# Patient Record
Sex: MALE | Race: WHITE | NOT HISPANIC OR LATINO | Employment: OTHER | URBAN - METROPOLITAN AREA
[De-identification: names, ages, dates, MRNs, and addresses within clinical notes are randomized per-mention and may not be internally consistent; named-entity substitution may affect disease eponyms.]

---

## 2023-11-29 ENCOUNTER — APPOINTMENT (OUTPATIENT)
Dept: RADIOLOGY | Facility: HOSPITAL | Age: 63
End: 2023-11-29
Payer: MEDICARE

## 2023-11-29 ENCOUNTER — HOSPITAL ENCOUNTER (EMERGENCY)
Facility: HOSPITAL | Age: 63
Discharge: HOME | End: 2023-11-29
Payer: MEDICARE

## 2023-11-29 ENCOUNTER — HOSPITAL ENCOUNTER (EMERGENCY)
Dept: CARDIOLOGY | Facility: HOSPITAL | Age: 63
Discharge: HOME | End: 2023-11-29
Payer: MEDICARE

## 2023-11-29 VITALS
DIASTOLIC BLOOD PRESSURE: 75 MMHG | RESPIRATION RATE: 14 BRPM | HEIGHT: 70 IN | BODY MASS INDEX: 22.9 KG/M2 | SYSTOLIC BLOOD PRESSURE: 116 MMHG | TEMPERATURE: 97.7 F | OXYGEN SATURATION: 96 % | HEART RATE: 79 BPM | WEIGHT: 160 LBS

## 2023-11-29 DIAGNOSIS — U07.1 COVID: ICD-10-CM

## 2023-11-29 DIAGNOSIS — R53.1 GENERALIZED WEAKNESS: Primary | ICD-10-CM

## 2023-11-29 LAB
ALBUMIN SERPL BCP-MCNC: 4.1 G/DL (ref 3.4–5)
ALP SERPL-CCNC: 102 U/L (ref 33–136)
ALT SERPL W P-5'-P-CCNC: 72 U/L (ref 10–52)
ANION GAP SERPL CALC-SCNC: 21 MMOL/L (ref 10–20)
APPEARANCE UR: CLEAR
AST SERPL W P-5'-P-CCNC: 75 U/L (ref 9–39)
BASOPHILS # BLD AUTO: 0.01 X10*3/UL (ref 0–0.1)
BASOPHILS NFR BLD AUTO: 0.1 %
BILIRUB SERPL-MCNC: 0.3 MG/DL (ref 0–1.2)
BILIRUB UR STRIP.AUTO-MCNC: NEGATIVE MG/DL
BUN SERPL-MCNC: 29 MG/DL (ref 6–23)
CALCIUM SERPL-MCNC: 8.9 MG/DL (ref 8.6–10.3)
CARDIAC TROPONIN I PNL SERPL HS: 14 NG/L (ref 0–20)
CHLORIDE SERPL-SCNC: 91 MMOL/L (ref 98–107)
CO2 SERPL-SCNC: 24 MMOL/L (ref 21–32)
COLOR UR: YELLOW
CREAT SERPL-MCNC: 1.32 MG/DL (ref 0.5–1.3)
EOSINOPHIL # BLD AUTO: 0 X10*3/UL (ref 0–0.7)
EOSINOPHIL NFR BLD AUTO: 0 %
ERYTHROCYTE [DISTWIDTH] IN BLOOD BY AUTOMATED COUNT: 13 % (ref 11.5–14.5)
FLUAV RNA RESP QL NAA+PROBE: NOT DETECTED
FLUBV RNA RESP QL NAA+PROBE: NOT DETECTED
GFR SERPL CREATININE-BSD FRML MDRD: 60 ML/MIN/1.73M*2
GLUCOSE SERPL-MCNC: 150 MG/DL (ref 74–99)
GLUCOSE UR STRIP.AUTO-MCNC: ABNORMAL MG/DL
HCT VFR BLD AUTO: 46.7 % (ref 41–52)
HGB BLD-MCNC: 15.4 G/DL (ref 13.5–17.5)
HOLD SPECIMEN: NORMAL
HYALINE CASTS #/AREA URNS AUTO: ABNORMAL /LPF
IMM GRANULOCYTES # BLD AUTO: 0.03 X10*3/UL (ref 0–0.7)
IMM GRANULOCYTES NFR BLD AUTO: 0.3 % (ref 0–0.9)
INR PPP: 1.1 (ref 0.9–1.1)
KETONES UR STRIP.AUTO-MCNC: ABNORMAL MG/DL
LACTATE SERPL-SCNC: 1.9 MMOL/L (ref 0.4–2)
LEUKOCYTE ESTERASE UR QL STRIP.AUTO: NEGATIVE
LIPASE SERPL-CCNC: 81 U/L (ref 9–82)
LYMPHOCYTES # BLD AUTO: 1.01 X10*3/UL (ref 1.2–4.8)
LYMPHOCYTES NFR BLD AUTO: 9.1 %
MCH RBC QN AUTO: 29.7 PG (ref 26–34)
MCHC RBC AUTO-ENTMCNC: 33 G/DL (ref 32–36)
MCV RBC AUTO: 90 FL (ref 80–100)
MONOCYTES # BLD AUTO: 1.57 X10*3/UL (ref 0.1–1)
MONOCYTES NFR BLD AUTO: 14.2 %
MUCOUS THREADS #/AREA URNS AUTO: ABNORMAL /LPF
NEUTROPHILS # BLD AUTO: 8.42 X10*3/UL (ref 1.2–7.7)
NEUTROPHILS NFR BLD AUTO: 76.3 %
NITRITE UR QL STRIP.AUTO: NEGATIVE
NRBC BLD-RTO: 0 /100 WBCS (ref 0–0)
PH UR STRIP.AUTO: 5 [PH]
PLATELET # BLD AUTO: 199 X10*3/UL (ref 150–450)
POTASSIUM SERPL-SCNC: 4.8 MMOL/L (ref 3.5–5.3)
PROT SERPL-MCNC: 7.8 G/DL (ref 6.4–8.2)
PROT UR STRIP.AUTO-MCNC: ABNORMAL MG/DL
PROTHROMBIN TIME: 12.9 SECONDS (ref 9.8–12.8)
RBC # BLD AUTO: 5.18 X10*6/UL (ref 4.5–5.9)
RBC # UR STRIP.AUTO: ABNORMAL /UL
RBC #/AREA URNS AUTO: ABNORMAL /HPF
SARS-COV-2 RNA RESP QL NAA+PROBE: DETECTED
SODIUM SERPL-SCNC: 131 MMOL/L (ref 136–145)
SP GR UR STRIP.AUTO: 1.02
UROBILINOGEN UR STRIP.AUTO-MCNC: <2 MG/DL
WBC # BLD AUTO: 11 X10*3/UL (ref 4.4–11.3)
WBC #/AREA URNS AUTO: ABNORMAL /HPF

## 2023-11-29 PROCEDURE — 99285 EMERGENCY DEPT VISIT HI MDM: CPT | Mod: 25 | Performed by: PHYSICIAN ASSISTANT

## 2023-11-29 PROCEDURE — 83690 ASSAY OF LIPASE: CPT | Performed by: PHYSICIAN ASSISTANT

## 2023-11-29 PROCEDURE — 70450 CT HEAD/BRAIN W/O DYE: CPT | Performed by: RADIOLOGY

## 2023-11-29 PROCEDURE — 80053 COMPREHEN METABOLIC PANEL: CPT | Performed by: PHYSICIAN ASSISTANT

## 2023-11-29 PROCEDURE — 72100 X-RAY EXAM L-S SPINE 2/3 VWS: CPT | Performed by: RADIOLOGY

## 2023-11-29 PROCEDURE — 71045 X-RAY EXAM CHEST 1 VIEW: CPT | Performed by: RADIOLOGY

## 2023-11-29 PROCEDURE — 96360 HYDRATION IV INFUSION INIT: CPT

## 2023-11-29 PROCEDURE — 99284 EMERGENCY DEPT VISIT MOD MDM: CPT

## 2023-11-29 PROCEDURE — 85610 PROTHROMBIN TIME: CPT | Performed by: PHYSICIAN ASSISTANT

## 2023-11-29 PROCEDURE — 70450 CT HEAD/BRAIN W/O DYE: CPT

## 2023-11-29 PROCEDURE — 99285 EMERGENCY DEPT VISIT HI MDM: CPT | Mod: 25

## 2023-11-29 PROCEDURE — 72100 X-RAY EXAM L-S SPINE 2/3 VWS: CPT | Mod: FY

## 2023-11-29 PROCEDURE — 36415 COLL VENOUS BLD VENIPUNCTURE: CPT | Performed by: PHYSICIAN ASSISTANT

## 2023-11-29 PROCEDURE — 81003 URINALYSIS AUTO W/O SCOPE: CPT | Performed by: PHYSICIAN ASSISTANT

## 2023-11-29 PROCEDURE — 71045 X-RAY EXAM CHEST 1 VIEW: CPT | Mod: FY

## 2023-11-29 PROCEDURE — 2500000004 HC RX 250 GENERAL PHARMACY W/ HCPCS (ALT 636 FOR OP/ED): Performed by: PHYSICIAN ASSISTANT

## 2023-11-29 PROCEDURE — 84484 ASSAY OF TROPONIN QUANT: CPT | Performed by: PHYSICIAN ASSISTANT

## 2023-11-29 PROCEDURE — 81001 URINALYSIS AUTO W/SCOPE: CPT | Performed by: PHYSICIAN ASSISTANT

## 2023-11-29 PROCEDURE — 96360 HYDRATION IV INFUSION INIT: CPT | Performed by: PHYSICIAN ASSISTANT

## 2023-11-29 PROCEDURE — 93005 ELECTROCARDIOGRAM TRACING: CPT

## 2023-11-29 PROCEDURE — 85025 COMPLETE CBC W/AUTO DIFF WBC: CPT | Performed by: PHYSICIAN ASSISTANT

## 2023-11-29 PROCEDURE — 83605 ASSAY OF LACTIC ACID: CPT | Performed by: PHYSICIAN ASSISTANT

## 2023-11-29 PROCEDURE — 87636 SARSCOV2 & INF A&B AMP PRB: CPT | Performed by: PHYSICIAN ASSISTANT

## 2023-11-29 RX ADMIN — SODIUM CHLORIDE 500 ML: 9 INJECTION, SOLUTION INTRAVENOUS at 05:49

## 2023-11-29 ASSESSMENT — COLUMBIA-SUICIDE SEVERITY RATING SCALE - C-SSRS
1. IN THE PAST MONTH, HAVE YOU WISHED YOU WERE DEAD OR WISHED YOU COULD GO TO SLEEP AND NOT WAKE UP?: NO
6. HAVE YOU EVER DONE ANYTHING, STARTED TO DO ANYTHING, OR PREPARED TO DO ANYTHING TO END YOUR LIFE?: NO
2. HAVE YOU ACTUALLY HAD ANY THOUGHTS OF KILLING YOURSELF?: NO

## 2023-11-29 ASSESSMENT — PAIN - FUNCTIONAL ASSESSMENT
PAIN_FUNCTIONAL_ASSESSMENT: 0-10
PAIN_FUNCTIONAL_ASSESSMENT: 0-10

## 2023-11-29 ASSESSMENT — LIFESTYLE VARIABLES
HAVE YOU EVER FELT YOU SHOULD CUT DOWN ON YOUR DRINKING: NO
HAVE PEOPLE ANNOYED YOU BY CRITICIZING YOUR DRINKING: NO
EVER FELT BAD OR GUILTY ABOUT YOUR DRINKING: NO
REASON UNABLE TO ASSESS: NO
EVER HAD A DRINK FIRST THING IN THE MORNING TO STEADY YOUR NERVES TO GET RID OF A HANGOVER: NO

## 2023-11-29 ASSESSMENT — PAIN SCALES - GENERAL
PAINLEVEL_OUTOF10: 0 - NO PAIN
PAINLEVEL_OUTOF10: 0 - NO PAIN

## 2023-11-29 NOTE — ED PROVIDER NOTES
I received Jericho Emerson in signout from Azra HARRISON please see the previous note for all HPI, PE and MDM up to the time of signout at 0600.    In brief Jericho Emerson is an 63 y.o. male presenting for   Chief Complaint   Patient presents with    Weakness, Gen     Sleeping for days, no energy, hardly able to walk on his own and seems out of it per wife. This has been going on since Saturday.      63-year-old male with a history of HTN, HLD, CAD, GERD, diabetes presenting to the ED today with generalized weakness and fatigue for the past 3 days.  There was no fall or injury and no recent illness.  Patient states that he just feels weak all over his body and will sometimes feel nauseous and he has not been eating.  His wife is also the historian today.  He arrives afebrile with stable vital signs and is resting comfortably without signs of acute distress.  On my exam heart RRR, lungs are clear there is no CVA tenderness.  Abdomen soft nondistended nontender with normal bowel sounds.  He is tender midline through the lumbar spine without step-offs or obvious signs of trauma and there is no paraspinal tenderness.  CT head, chest x-ray and lumbar x-ray are ordered as well as ECG and labs.       ECG per my interpretation normal sinus rhythm at 78 bpm without acute ST-T wave changes or arrhythmia.  CT head shows no acute intracranial abnormality.  Chest x-ray shows no acute cardiopulmonary process and x-ray of the lumbar spine shows an age-indeterminate L1 fracture that does appear progressed from x-ray in 2010 but no osseous or retropulsion identified.  CBC with stable H&H and no leukocytosis.  Lipase is 81, lactic is 1.9.  Flu and COVID are negative.  CMP with mild elevation in AST and ALT.  Blood glucose is 150.  Creatinine is 1.32 and GFR is 60  Patient is still pending COVID result and urinalysis and ambulation.    Patient's urinalysis is negative for both leukocytes and nitrites.  Patient's COVID swab is  positive.  Patient requesting to have Paxlovid, prescription sent to pharmacy.  Patient was able to ambulate steadily and independently prior to discharge.  Patient courage to drink fluids and [facility]  We also discussed symptom management at home.  Return parameters discussed with patient.  All patient's questions and concerns were addressed prior to discharge.  We also discussed the most recent CDC recommendations for isolation.  Patient verbalizes understanding.  Patient discharged home in stable condition.    Pt Disposition: Home        SIMONE Beard-CNP  11/29/23 0715

## 2023-11-29 NOTE — ED PROVIDER NOTES
HPI   Chief Complaint   Patient presents with    Weakness, Gen     Sleeping for days, no energy, hardly able to walk on his own and seems out of it per wife. This has been going on since Saturday.        63-year-old male with history of HTN, HLD, CAD currently on Plavix, diabetes and GERD presenting to the ED today feeling weak, sleepy for the past 3 days.  There was no fall or injury,  patient tells me he has chronic issues with his lower back but he was carrying some wood Saturday, 3 days ago.  He felt really tired after moving the wood and his back felt achy so he laid down on the number since then he has felt very fatigued, does not want to eat or drink and he feels weak all over his body.  He denies headache or dizziness or visual changes and denies numbness or paresthesias.  He has not had a fever but spouse says he has had slight cough.  Patient denies any chest pain or shortness of breath and he denies nausea, vomiting, diarrhea or abdominal pain.  He denies any dark or bloody stools.  He tells me he does not have any leg pain or swelling and he denies history of DVT or PE.  No further complaints at this time.      History provided by:  Patient and spouse                      Tejal Coma Scale Score: 15                  Patient History   History reviewed. No pertinent past medical history.  History reviewed. No pertinent surgical history.  No family history on file.  Social History     Tobacco Use    Smoking status: Not on file    Smokeless tobacco: Not on file   Substance Use Topics    Alcohol use: Not on file    Drug use: Not on file       Physical Exam   ED Triage Vitals [11/29/23 0318]   Temp Heart Rate Resp BP   36.5 °C (97.7 °F) 89 18 117/64      SpO2 Temp Source Heart Rate Source Patient Position   97 % Temporal -- Lying      BP Location FiO2 (%)     Right arm --       Physical Exam  Constitutional:       General: He is not in acute distress.     Appearance: He is not toxic-appearing.   HENT:       Nose: Nose normal.      Mouth/Throat:      Mouth: Mucous membranes are moist.      Pharynx: Oropharynx is clear.   Eyes:      Extraocular Movements: Extraocular movements intact.      Conjunctiva/sclera: Conjunctivae normal.      Pupils: Pupils are equal, round, and reactive to light.   Cardiovascular:      Rate and Rhythm: Normal rate and regular rhythm.      Pulses: Normal pulses.      Heart sounds: Normal heart sounds.   Pulmonary:      Effort: Pulmonary effort is normal.      Breath sounds: Normal breath sounds.   Abdominal:      General: Bowel sounds are normal. There is no distension.      Palpations: Abdomen is soft.      Tenderness: There is no abdominal tenderness. There is no right CVA tenderness, left CVA tenderness or guarding.   Musculoskeletal:      Cervical back: Normal range of motion and neck supple. No rigidity or tenderness.      Comments: Tenderness over the lumbar spine midline diffusely without step-offs or obvious signs of trauma.  Equal strength tone and sensation to extremities without bony tenderness of bony deformity.  No calf tenderness or sign bilaterally. NVI.    Skin:     General: Skin is warm.      Capillary Refill: Capillary refill takes less than 2 seconds.   Neurological:      Mental Status: He is alert and oriented to person, place, and time.         ED Course & MDM   Diagnoses as of 11/29/23 0544   Generalized weakness       Medical Decision Making  63-year-old male with a history of HTN, HLD, CAD, GERD, diabetes presenting to the ED today with generalized weakness and fatigue for the past 3 days.  There was no fall or injury and no recent illness.  Patient states that he just feels weak all over his body and will sometimes feel nauseous and he has not been eating.  His wife is also the historian today.  He arrives afebrile with stable vital signs and is resting comfortably without signs of acute distress.  On my exam heart RRR, lungs are clear there is no CVA tenderness.   Abdomen soft nondistended nontender with normal bowel sounds.  He is tender midline through the lumbar spine without step-offs or obvious signs of trauma and there is no paraspinal tenderness.  CT head, chest x-ray and lumbar x-ray are ordered as well as ECG and labs.      ECG per my interpretation normal sinus rhythm at 78 bpm without acute ST-T wave changes or arrhythmia.  CT head shows no acute intracranial abnormality.  Chest x-ray shows no acute cardiopulmonary process and x-ray of the lumbar spine shows an age-indeterminate L1 fracture that does appear progressed from x-ray in 2010 but no osseous or retropulsion identified.  CBC with stable H&H and no leukocytosis.  Lipase is 81, lactic is 1.9.  Flu and COVID are negative.  CMP with mild elevation in AST and ALT.  Blood glucose is 150.  Creatinine is 1.32 and GFR is 60 however I am not able to compare to any old labs.  Patient is still pending COVID result and urinalysis and ambulation test, he is signed out of my care to Kelsy Street NP.            Procedure  Procedures     Azra Hyman PA-C  11/29/23 0544

## 2023-12-01 NOTE — RESULT ENCOUNTER NOTE
Entered erroneously in pool, if UA refluxes to culture that requires treatment, will be treated by pharmacy team.

## 2024-04-17 PROBLEM — I10 ESSENTIAL HYPERTENSION: Status: ACTIVE | Noted: 2024-04-17

## 2024-04-17 PROBLEM — I25.10 2-VESSEL CORONARY ARTERY DISEASE: Status: ACTIVE | Noted: 2024-04-17

## 2024-04-17 PROBLEM — E78.2 MIXED HYPERLIPIDEMIA: Status: ACTIVE | Noted: 2024-04-17

## 2024-04-17 PROBLEM — I25.5 CARDIOMYOPATHY, ISCHEMIC: Status: ACTIVE | Noted: 2024-04-17

## 2024-04-18 ENCOUNTER — OFFICE VISIT (OUTPATIENT)
Dept: CARDIOLOGY | Facility: CLINIC | Age: 64
End: 2024-04-18
Payer: OTHER GOVERNMENT

## 2024-04-18 VITALS
WEIGHT: 164.8 LBS | SYSTOLIC BLOOD PRESSURE: 98 MMHG | HEART RATE: 78 BPM | HEIGHT: 70 IN | BODY MASS INDEX: 23.59 KG/M2 | DIASTOLIC BLOOD PRESSURE: 62 MMHG

## 2024-04-18 DIAGNOSIS — E11.9 TYPE 2 DIABETES MELLITUS WITHOUT COMPLICATION, WITH LONG-TERM CURRENT USE OF INSULIN (MULTI): ICD-10-CM

## 2024-04-18 DIAGNOSIS — Z79.4 TYPE 2 DIABETES MELLITUS WITHOUT COMPLICATION, WITH LONG-TERM CURRENT USE OF INSULIN (MULTI): ICD-10-CM

## 2024-04-18 DIAGNOSIS — I25.10 2-VESSEL CORONARY ARTERY DISEASE: Primary | ICD-10-CM

## 2024-04-18 DIAGNOSIS — Z87.891 FORMER SMOKER: ICD-10-CM

## 2024-04-18 DIAGNOSIS — I25.5 CARDIOMYOPATHY, ISCHEMIC: ICD-10-CM

## 2024-04-18 DIAGNOSIS — I10 BENIGN ESSENTIAL HYPERTENSION: ICD-10-CM

## 2024-04-18 DIAGNOSIS — I10 ESSENTIAL HYPERTENSION: ICD-10-CM

## 2024-04-18 DIAGNOSIS — E78.2 MIXED HYPERLIPIDEMIA: ICD-10-CM

## 2024-04-18 DIAGNOSIS — R07.2 PRECORDIAL CHEST PAIN: ICD-10-CM

## 2024-04-18 PROBLEM — I42.9 CARDIOMYOPATHY (MULTI): Status: ACTIVE | Noted: 2024-04-18

## 2024-04-18 PROBLEM — I42.9 CARDIOMYOPATHY (MULTI): Status: RESOLVED | Noted: 2024-04-18 | Resolved: 2024-04-18

## 2024-04-18 PROCEDURE — 99204 OFFICE O/P NEW MOD 45 MIN: CPT | Performed by: INTERNAL MEDICINE

## 2024-04-18 PROCEDURE — 3074F SYST BP LT 130 MM HG: CPT | Performed by: INTERNAL MEDICINE

## 2024-04-18 PROCEDURE — 1159F MED LIST DOCD IN RCRD: CPT | Performed by: INTERNAL MEDICINE

## 2024-04-18 PROCEDURE — 3078F DIAST BP <80 MM HG: CPT | Performed by: INTERNAL MEDICINE

## 2024-04-18 PROCEDURE — 1160F RVW MEDS BY RX/DR IN RCRD: CPT | Performed by: INTERNAL MEDICINE

## 2024-04-18 PROCEDURE — 1036F TOBACCO NON-USER: CPT | Performed by: INTERNAL MEDICINE

## 2024-04-18 PROCEDURE — 93000 ELECTROCARDIOGRAM COMPLETE: CPT | Performed by: INTERNAL MEDICINE

## 2024-04-18 RX ORDER — IPRATROPIUM BROMIDE 42 UG/1
1 SPRAY, METERED NASAL 2 TIMES DAILY
COMMUNITY
Start: 2023-08-04

## 2024-04-18 RX ORDER — ARIPIPRAZOLE 15 MG/1
15 TABLET ORAL DAILY
COMMUNITY
Start: 2023-10-02

## 2024-04-18 RX ORDER — AMLODIPINE BESYLATE 5 MG/1
1 TABLET ORAL DAILY
COMMUNITY
Start: 2024-01-18

## 2024-04-18 RX ORDER — CLOPIDOGREL BISULFATE 75 MG/1
75 TABLET ORAL DAILY
COMMUNITY
Start: 2015-01-20

## 2024-04-18 RX ORDER — OMEPRAZOLE 20 MG/1
20 CAPSULE, DELAYED RELEASE ORAL
COMMUNITY
Start: 2024-02-02

## 2024-04-18 RX ORDER — DULOXETIN HYDROCHLORIDE 60 MG/1
1 CAPSULE, DELAYED RELEASE ORAL EVERY EVENING
COMMUNITY
Start: 2015-01-06

## 2024-04-18 RX ORDER — BUPROPION HYDROCHLORIDE 150 MG/1
150 TABLET, EXTENDED RELEASE ORAL 3 TIMES DAILY
COMMUNITY

## 2024-04-18 RX ORDER — ROSUVASTATIN CALCIUM 20 MG/1
1 TABLET, COATED ORAL NIGHTLY
COMMUNITY
Start: 2024-02-02

## 2024-04-18 RX ORDER — SEMAGLUTIDE 1.34 MG/ML
1 INJECTION, SOLUTION SUBCUTANEOUS
COMMUNITY

## 2024-04-18 RX ORDER — CHOLECALCIFEROL (VITAMIN D3) 25 MCG
75 TABLET ORAL DAILY
COMMUNITY
Start: 2023-09-13

## 2024-04-18 RX ORDER — NITROGLYCERIN 0.4 MG/1
0.4 TABLET SUBLINGUAL EVERY 5 MIN PRN
Qty: 100 TABLET | Refills: 3 | Status: SHIPPED | OUTPATIENT
Start: 2024-04-18 | End: 2025-04-18

## 2024-04-18 NOTE — PROGRESS NOTES
Cardiology Consultation- New Consult    Reason for referral: chest pressure    HPI: Jericho Emerson Jr. is a 63 y.o. male with known history of coronary artery disease back in 2014 he underwent PCI to the RCA and left circumflex.  He has not been seen by our office in several years.  The patient has a history of mild ischemic cardiomyopathy, hypertension and hyperlipidemia.  Patient report recently has been having episodes of chest heaviness without clear precipitating, alleviated or associated symptoms.  Typically nonexertional.  It lasted few minutes.  He remains reasonably active.  He is compliant with his medication.  He denies any intervening events since the last time we saw him in the office few years back.    Assessment    1.  Two-vessel coronary artery disease with remote PCI to the RCA and left circumflex with recent complaint of nonexertional brief chest heaviness  2.  Essential hypertension controlled  3.  [facility]  4.  Diabetes mellitus has lost 20 pounds on Ozempic  5.  Mild ischemic cardiomyopathy with LVEF around 45% in the past no recent reevaluation no    Recommendation    1.  I provide the patient was prescription for nitroglycerin to use as needed  2.  The patient appears to be on good medical regimen  3.  I recommend proceeding with GXT for evaluation  4.  Follow-up after testing is done      Past Medical History:   He has no past medical history on file.    Surgical History:   He has a past surgical history that includes Coronary stent placement; Gallbladder surgery; and Colonoscopy.    Family History:   Family History   Problem Relation Name Age of Onset    Heart failure Mother      Diabetes type I Mother      Hypertension Father      Stroke Father      Cancer Sister      No Known Problems Brother         Social History:   Social History     Tobacco Use    Smoking status: Former     Types: Cigarettes    Smokeless tobacco: Never   Substance Use Topics    Alcohol  "use: Never        Allergies:  Tetanus vaccines and toxoid     Current Medications:    Current Outpatient Medications:     amLODIPine (Norvasc) 5 mg tablet, Take 1 tablet (5 mg) by mouth once daily., Disp: , Rfl:     ARIPiprazole (Abilify) 15 mg tablet, Take 1 tablet (15 mg) by mouth once daily., Disp: , Rfl:     buPROPion SR (Wellbutrin SR) 150 mg 12 hr tablet, Take 1 tablet (150 mg) by mouth 3 times a day., Disp: , Rfl:     cholecalciferol (Vitamin D-3) 25 MCG (1000 UT) tablet, Take 3 tablets (75 mcg) by mouth once daily., Disp: , Rfl:     clopidogrel (Plavix) 75 mg tablet, Take 1 tablet (75 mg) by mouth once daily., Disp: , Rfl:     DULoxetine (Cymbalta) 60 mg DR capsule, Take 1 capsule (60 mg) by mouth once daily in the evening., Disp: , Rfl:     empagliflozin-metformin (Synjardy XR) 12.5-1,000 mg 24 hr tablet, Take 2 tablets by mouth once daily with breakfast., Disp: , Rfl:     ipratropium (Atrovent) 42 mcg (0.06 %) nasal spray, Administer 1 spray into each nostril 2 times a day., Disp: , Rfl:     omeprazole (PriLOSEC) 20 mg DR capsule, Take 1 capsule (20 mg) by mouth once daily in the morning. Take before meals., Disp: , Rfl:     rosuvastatin (Crestor) 20 mg tablet, Take 1 tablet (20 mg) by mouth once daily at bedtime., Disp: , Rfl:     semaglutide (Ozempic) 1 mg/dose (2 mg/1.5 mL) pen injector, Inject 1 mg under the skin 1 (one) time per week., Disp: , Rfl:     nitroglycerin (Nitrostat) 0.4 mg SL tablet, Place 1 tablet (0.4 mg) under the tongue every 5 minutes if needed for chest pain. May repeat dose every 5 minutes for up to 3 doses total., Disp: 100 tablet, Rfl: 3     Vitals:  Vitals:    04/18/24 1435 04/18/24 1436   BP: 102/68 98/62   BP Location: Left arm Right arm   Patient Position: Sitting    Pulse: 78    Weight: 74.8 kg (164 lb 12.8 oz)    Height: 1.778 m (5' 10\")     EKG done in office today        Review of Systems   All other systems reviewed and are negative.      Objective         Physical " Exam  Constitutional:       Appearance: Normal appearance.   HENT:      Nose: Nose normal.   Neck:      Vascular: No carotid bruit.   Cardiovascular:      Rate and Rhythm: Normal rate.      Pulses: Normal pulses.      Heart sounds: Normal heart sounds.   Pulmonary:      Effort: Pulmonary effort is normal.   Abdominal:      General: Bowel sounds are normal.      Palpations: Abdomen is soft.   Musculoskeletal:         General: Normal range of motion.      Cervical back: Normal range of motion.      Right lower leg: No edema.      Left lower leg: No edema.   Skin:     General: Skin is warm and dry.   Neurological:      General: No focal deficit present.      Mental Status: He is alert.   Psychiatric:         Mood and Affect: Mood normal.         Behavior: Behavior normal.         Thought Content: Thought content normal.         Judgment: Judgment normal.                Assessment and Plan:   1. 2-vessel coronary artery disease        2. Precordial chest pain  Follow Up In Cardiology    ECG 12 Lead    Stress Test    nitroglycerin (Nitrostat) 0.4 mg SL tablet      3. Cardiomyopathy, ischemic        4. Essential hypertension        5. Mixed hyperlipidemia        6. Benign essential hypertension        7. BMI 23.0-23.9, adult        8. Type 2 diabetes mellitus without complication, with long-term current use of insulin (Multi)        9. Former smoker               Scribe Attestation  By signing my name below, IThuy LPN  , Scribe   attest that this documentation has been prepared under the direction and in the presence of Ananda Marcial MD.    Provider Attestation - Scribe documentation    All medical record entries made by the Scribe were at my direction and personally dictated by me. I have reviewed the chart and agree that the record accurately reflects my personal performance of the history, physical exam, discussion and plan.

## 2024-04-18 NOTE — LETTER
April 18, 2024     Matt Burton DO  [address] [facility] [address]    Patient: Jericho Emerson Jr.   YOB: 1960   Date of Visit: 4/18/2024       Dear Dr. Matt Burton DO:    Thank you for referring Jericho Emerson to me for evaluation. Below are my notes for this consultation.  If you have questions, please do not hesitate to call me. I look forward to following your patient along with you.       Sincerely,     Ananda Marcial MD      CC: No Recipients  ______________________________________________________________________________________    Cardiology Consultation- New Consult    Reason for referral: chest pressure    HPI: Jericho Emerson Jr. is a 63 y.o. male with known history of coronary artery disease back in 2014 he underwent PCI to the RCA and left circumflex.  He has not been seen by our office in several years.  The patient has a history of mild ischemic cardiomyopathy, hypertension and hyperlipidemia.  Patient report recently has been having episodes of chest heaviness without clear precipitating, alleviated or associated symptoms.  Typically nonexertional.  It lasted few minutes.  He remains reasonably active.  He is compliant with his medication.  He denies any intervening events since the last time we saw him in the office few years back.    Assessment    1.  Two-vessel coronary artery disease with remote PCI to the RCA and left circumflex with recent complaint of nonexertional brief chest heaviness  2.  Essential hypertension controlled  3.  [facility]  4.  Diabetes mellitus has lost 20 pounds on Ozempic  5.  Mild ischemic cardiomyopathy with LVEF around 45% in the past no recent reevaluation no    Recommendation    1.  I provide the patient was prescription for nitroglycerin to use as needed  2.  The patient appears to be on good medical regimen  3.  I recommend proceeding with GXT for evaluation  4.  Follow-up  after testing is done      Past Medical History:   He has no past medical history on file.    Surgical History:   He has a past surgical history that includes Coronary stent placement; Gallbladder surgery; and Colonoscopy.    Family History:   Family History   Problem Relation Name Age of Onset   • Heart failure Mother     • Diabetes type I Mother     • Hypertension Father     • Stroke Father     • Cancer Sister     • No Known Problems Brother         Social History:   Social History     Tobacco Use   • Smoking status: Former     Types: Cigarettes   • Smokeless tobacco: Never   Substance Use Topics   • Alcohol use: Never        Allergies:  Tetanus vaccines and toxoid     Current Medications:    Current Outpatient Medications:   •  amLODIPine (Norvasc) 5 mg tablet, Take 1 tablet (5 mg) by mouth once daily., Disp: , Rfl:   •  ARIPiprazole (Abilify) 15 mg tablet, Take 1 tablet (15 mg) by mouth once daily., Disp: , Rfl:   •  buPROPion SR (Wellbutrin SR) 150 mg 12 hr tablet, Take 1 tablet (150 mg) by mouth 3 times a day., Disp: , Rfl:   •  cholecalciferol (Vitamin D-3) 25 MCG (1000 UT) tablet, Take 3 tablets (75 mcg) by mouth once daily., Disp: , Rfl:   •  clopidogrel (Plavix) 75 mg tablet, Take 1 tablet (75 mg) by mouth once daily., Disp: , Rfl:   •  DULoxetine (Cymbalta) 60 mg DR capsule, Take 1 capsule (60 mg) by mouth once daily in the evening., Disp: , Rfl:   •  empagliflozin-metformin (Synjardy XR) 12.5-1,000 mg 24 hr tablet, Take 2 tablets by mouth once daily with breakfast., Disp: , Rfl:   •  ipratropium (Atrovent) 42 mcg (0.06 %) nasal spray, Administer 1 spray into each nostril 2 times a day., Disp: , Rfl:   •  omeprazole (PriLOSEC) 20 mg DR capsule, Take 1 capsule (20 mg) by mouth once daily in the morning. Take before meals., Disp: , Rfl:   •  rosuvastatin (Crestor) 20 mg tablet, Take 1 tablet (20 mg) by mouth once daily at bedtime., Disp: , Rfl:   •  semaglutide (Ozempic) 1 mg/dose (2 mg/1.5 mL) pen  "injector, Inject 1 mg under the skin 1 (one) time per week., Disp: , Rfl:   •  nitroglycerin (Nitrostat) 0.4 mg SL tablet, Place 1 tablet (0.4 mg) under the tongue every 5 minutes if needed for chest pain. May repeat dose every 5 minutes for up to 3 doses total., Disp: 100 tablet, Rfl: 3     Vitals:  Vitals:    04/18/24 1435 04/18/24 1436   BP: 102/68 98/62   BP Location: Left arm Right arm   Patient Position: Sitting    Pulse: 78    Weight: 74.8 kg (164 lb 12.8 oz)    Height: 1.778 m (5' 10\")     EKG done in office today        Review of Systems   All other systems reviewed and are negative.      Objective        Physical Exam  Constitutional:       Appearance: Normal appearance.   HENT:      Nose: Nose normal.   Neck:      Vascular: No carotid bruit.   Cardiovascular:      Rate and Rhythm: Normal rate.      Pulses: Normal pulses.      Heart sounds: Normal heart sounds.   Pulmonary:      Effort: Pulmonary effort is normal.   Abdominal:      General: Bowel sounds are normal.      Palpations: Abdomen is soft.   Musculoskeletal:         General: Normal range of motion.      Cervical back: Normal range of motion.      Right lower leg: No edema.      Left lower leg: No edema.   Skin:     General: Skin is warm and dry.   Neurological:      General: No focal deficit present.      Mental Status: He is alert.   Psychiatric:         Mood and Affect: Mood normal.         Behavior: Behavior normal.         Thought Content: Thought content normal.         Judgment: Judgment normal.                Assessment and Plan:   1. 2-vessel coronary artery disease        2. Precordial chest pain  Follow Up In Cardiology    ECG 12 Lead    Stress Test    nitroglycerin (Nitrostat) 0.4 mg SL tablet      3. Cardiomyopathy, ischemic        4. Essential hypertension        5. Mixed hyperlipidemia        6. Benign essential hypertension        7. BMI 23.0-23.9, adult        8. Type 2 diabetes mellitus without complication, with long-term current " use of insulin (Multi)        9. Former smoker               Scribe Attestation  By signing my name below, I, Thuy SINGH LPN  , Scribe   attest that this documentation has been prepared under the direction and in the presence of Ananda Marcial MD.    Provider Attestation - Scribe documentation    All medical record entries made by the Scribe were at my direction and personally dictated by me. I have reviewed the chart and agree that the record accurately reflects my personal performance of the history, physical exam, discussion and plan.

## 2024-04-30 ENCOUNTER — APPOINTMENT (OUTPATIENT)
Dept: CARDIOLOGY | Facility: CLINIC | Age: 64
End: 2024-04-30
Payer: OTHER GOVERNMENT

## 2024-05-16 ENCOUNTER — APPOINTMENT (OUTPATIENT)
Dept: CARDIOLOGY | Facility: CLINIC | Age: 64
End: 2024-05-16
Payer: OTHER GOVERNMENT

## 2024-06-03 ENCOUNTER — HOSPITAL ENCOUNTER (OUTPATIENT)
Dept: CARDIOLOGY | Facility: CLINIC | Age: 64
Discharge: HOME | End: 2024-06-03
Payer: OTHER GOVERNMENT

## 2024-06-03 ENCOUNTER — APPOINTMENT (OUTPATIENT)
Dept: CARDIOLOGY | Facility: CLINIC | Age: 64
End: 2024-06-03
Payer: OTHER GOVERNMENT

## 2024-06-03 VITALS — SYSTOLIC BLOOD PRESSURE: 118 MMHG | HEART RATE: 77 BPM | DIASTOLIC BLOOD PRESSURE: 74 MMHG

## 2024-06-03 DIAGNOSIS — R07.2 PRECORDIAL CHEST PAIN: ICD-10-CM

## 2024-06-03 PROCEDURE — 93017 CV STRESS TEST TRACING ONLY: CPT

## 2024-07-18 ENCOUNTER — APPOINTMENT (OUTPATIENT)
Dept: CARDIOLOGY | Facility: CLINIC | Age: 64
End: 2024-07-18
Payer: OTHER GOVERNMENT

## 2024-07-18 VITALS
HEIGHT: 70 IN | WEIGHT: 165.2 LBS | DIASTOLIC BLOOD PRESSURE: 66 MMHG | SYSTOLIC BLOOD PRESSURE: 114 MMHG | HEART RATE: 84 BPM | BODY MASS INDEX: 23.65 KG/M2

## 2024-07-18 DIAGNOSIS — I25.10 2-VESSEL CORONARY ARTERY DISEASE: Primary | ICD-10-CM

## 2024-07-18 DIAGNOSIS — I10 ESSENTIAL HYPERTENSION: ICD-10-CM

## 2024-07-18 DIAGNOSIS — I25.5 CARDIOMYOPATHY, ISCHEMIC: ICD-10-CM

## 2024-07-18 DIAGNOSIS — R07.2 PRECORDIAL CHEST PAIN: ICD-10-CM

## 2024-07-18 DIAGNOSIS — I10 BENIGN ESSENTIAL HYPERTENSION: ICD-10-CM

## 2024-07-18 DIAGNOSIS — Z87.891 FORMER SMOKER: ICD-10-CM

## 2024-07-18 DIAGNOSIS — E78.2 MIXED HYPERLIPIDEMIA: ICD-10-CM

## 2024-07-18 PROCEDURE — 1159F MED LIST DOCD IN RCRD: CPT | Performed by: INTERNAL MEDICINE

## 2024-07-18 PROCEDURE — 3074F SYST BP LT 130 MM HG: CPT | Performed by: INTERNAL MEDICINE

## 2024-07-18 PROCEDURE — 99213 OFFICE O/P EST LOW 20 MIN: CPT | Performed by: INTERNAL MEDICINE

## 2024-07-18 PROCEDURE — 3078F DIAST BP <80 MM HG: CPT | Performed by: INTERNAL MEDICINE

## 2024-07-18 PROCEDURE — 1036F TOBACCO NON-USER: CPT | Performed by: INTERNAL MEDICINE

## 2024-07-18 PROCEDURE — 3008F BODY MASS INDEX DOCD: CPT | Performed by: INTERNAL MEDICINE

## 2024-07-18 PROCEDURE — 1160F RVW MEDS BY RX/DR IN RCRD: CPT | Performed by: INTERNAL MEDICINE

## 2024-07-18 NOTE — LETTER
"July 18, 2024     Matt Burton DO  [address] [facility] [address]    Patient: Jericho Emerson Jr.   YOB: 1960   Date of Visit: 7/18/2024       Dear Dr. Matt Burton DO:    Thank you for referring Jericho Emerson to me for evaluation. Below are my notes for this consultation.  If you have questions, please do not hesitate to call me. I look forward to following your patient along with you.       Sincerely,     Ananda Marcial MD      CC: No Recipients  ______________________________________________________________________________________    Subjective   Jericho Emerson Jr. is a 63 y.o. male       Chief Complaint    Follow-up          HPI   Patient is here for follow-up continue management for history of coronary artery disease with prior PCI to the RCA, hypertension and hyperlipidemia.  Since last time I saw him he reports he is feeling well.  He denies complaint of chest pain, palpitation, lightheadedness, dizziness or syncope.  His recent stress test was reviewed with him.  The test was submaximal but he did not have any anginal symptomatology.  Assessment     1.  Two-vessel coronary artery disease with remote PCI to the RCA and left circumflex he denies any recent chest pain.  Recent stress test noted and reviewed with him.  The test was submaximal  3.  [facility]  4.  Diabetes mellitus   5.  Mild ischemic cardiomyopathy with LVEF around 45%  6.  Parkinson disease     Recommendation     1.  I advised the patient to continue present medical regimen  2.  we discussed risk factor modification  3.  I adjusted follow-up in 6 months    Review of Systems   All other systems reviewed and are negative.           Vitals:    07/18/24 1512   BP: 114/66   BP Location: Left arm   Patient Position: Sitting   Pulse: 84   Weight: 74.9 kg (165 lb 3.2 oz)   Height: 1.778 m (5' 10\")        Objective   Physical Exam  Constitutional:       " Appearance: Normal appearance.   HENT:      Nose: Nose normal.   Neck:      Vascular: No carotid bruit.   Cardiovascular:      Rate and Rhythm: Normal rate.      Pulses: Normal pulses.      Heart sounds: Normal heart sounds.   Pulmonary:      Effort: Pulmonary effort is normal.   Abdominal:      General: Bowel sounds are normal.      Palpations: Abdomen is soft.   Musculoskeletal:         General: Normal range of motion.      Cervical back: Normal range of motion.      Right lower leg: No edema.      Left lower leg: No edema.   Skin:     General: Skin is warm and dry.   Neurological:      General: No focal deficit present.      Mental Status: He is alert.   Psychiatric:         Mood and Affect: Mood normal.         Behavior: Behavior normal.         Thought Content: Thought content normal.         Judgment: Judgment normal.         Allergies  Tetanus vaccines and toxoid     Current Medications    Current Outpatient Medications:   •  amLODIPine (Norvasc) 5 mg tablet, Take 1 tablet (5 mg) by mouth once daily., Disp: , Rfl:   •  ARIPiprazole (Abilify) 15 mg tablet, Take 1 tablet (15 mg) by mouth once daily., Disp: , Rfl:   •  buPROPion SR (Wellbutrin SR) 150 mg 12 hr tablet, Take 1 tablet (150 mg) by mouth 3 times a day., Disp: , Rfl:   •  cholecalciferol (Vitamin D-3) 25 MCG (1000 UT) tablet, Take 3 tablets (75 mcg) by mouth once daily., Disp: , Rfl:   •  clopidogrel (Plavix) 75 mg tablet, Take 1 tablet (75 mg) by mouth once daily., Disp: , Rfl:   •  DULoxetine (Cymbalta) 60 mg DR capsule, Take 1 capsule (60 mg) by mouth once daily in the evening., Disp: , Rfl:   •  empagliflozin-metformin (Synjardy XR) 12.5-1,000 mg 24 hr tablet, Take 2 tablets by mouth once daily with breakfast., Disp: , Rfl:   •  ipratropium (Atrovent) 42 mcg (0.06 %) nasal spray, Administer 1 spray into each nostril 2 times a day., Disp: , Rfl:   •  nitroglycerin (Nitrostat) 0.4 mg SL tablet, Place 1 tablet (0.4 mg) under the tongue every 5  minutes if needed for chest pain. May repeat dose every 5 minutes for up to 3 doses total., Disp: 100 tablet, Rfl: 3  •  omeprazole (PriLOSEC) 20 mg DR capsule, Take 1 capsule (20 mg) by mouth once daily in the morning. Take before meals., Disp: , Rfl:   •  rosuvastatin (Crestor) 20 mg tablet, Take 1 tablet (20 mg) by mouth once daily at bedtime., Disp: , Rfl:   •  semaglutide (Ozempic) 1 mg/dose (2 mg/1.5 mL) pen injector, Inject 1 mg under the skin 1 (one) time per week., Disp: , Rfl:                      Assessment/Plan   1. 2-vessel coronary artery disease  Follow Up In Cardiology      2. Precordial chest pain  Follow Up In Cardiology      3. Benign essential hypertension        4. Cardiomyopathy, ischemic        5. Essential hypertension        6. Mixed hyperlipidemia        7. BMI 23.0-23.9, adult        8. Former smoker                 Scribe Attestation  By signing my name below, I, Amy Clements LPN   attest that this documentation has been prepared under the direction and in the presence of Ananda Marcial MD.     Provider Attestation - Scribe documentation    All medical record entries made by the Scribe were at my direction and personally dictated by me. I have reviewed the chart and agree that the record accurately reflects my personal performance of the history, physical exam, discussion and plan.

## 2024-07-18 NOTE — PROGRESS NOTES
"Subjective   Jericho Emerson Jr. is a 63 y.o. male       Chief Complaint    Follow-up          HPI   Patient is here for follow-up continue management for history of coronary artery disease with prior PCI to the RCA, hypertension and hyperlipidemia.  Since last time I saw him he reports he is feeling well.  He denies complaint of chest pain, palpitation, lightheadedness, dizziness or syncope.  His recent stress test was reviewed with him.  The test was submaximal but he did not have any anginal symptomatology.  Assessment     1.  Two-vessel coronary artery disease with remote PCI to the RCA and left circumflex he denies any recent chest pain.  Recent stress test noted and reviewed with him.  The test was submaximal  3.  [facility]  4.  Diabetes mellitus   5.  Mild ischemic cardiomyopathy with LVEF around 45%  6.  Parkinson disease     Recommendation     1.  I advised the patient to continue present medical regimen  2.  we discussed risk factor modification  3.  I adjusted follow-up in 6 months    Review of Systems   All other systems reviewed and are negative.           Vitals:    07/18/24 1512   BP: 114/66   BP Location: Left arm   Patient Position: Sitting   Pulse: 84   Weight: 74.9 kg (165 lb 3.2 oz)   Height: 1.778 m (5' 10\")        Objective   Physical Exam  Constitutional:       Appearance: Normal appearance.   HENT:      Nose: Nose normal.   Neck:      Vascular: No carotid bruit.   Cardiovascular:      Rate and Rhythm: Normal rate.      Pulses: Normal pulses.      Heart sounds: Normal heart sounds.   Pulmonary:      Effort: Pulmonary effort is normal.   Abdominal:      General: Bowel sounds are normal.      Palpations: Abdomen is soft.   Musculoskeletal:         General: Normal range of motion.      Cervical back: Normal range of motion.      Right lower leg: No edema.      Left lower leg: No edema.   Skin:     General: Skin is warm and dry.   Neurological:      General: No focal " deficit present.      Mental Status: He is alert.   Psychiatric:         Mood and Affect: Mood normal.         Behavior: Behavior normal.         Thought Content: Thought content normal.         Judgment: Judgment normal.         Allergies  Tetanus vaccines and toxoid     Current Medications    Current Outpatient Medications:     amLODIPine (Norvasc) 5 mg tablet, Take 1 tablet (5 mg) by mouth once daily., Disp: , Rfl:     ARIPiprazole (Abilify) 15 mg tablet, Take 1 tablet (15 mg) by mouth once daily., Disp: , Rfl:     buPROPion SR (Wellbutrin SR) 150 mg 12 hr tablet, Take 1 tablet (150 mg) by mouth 3 times a day., Disp: , Rfl:     cholecalciferol (Vitamin D-3) 25 MCG (1000 UT) tablet, Take 3 tablets (75 mcg) by mouth once daily., Disp: , Rfl:     clopidogrel (Plavix) 75 mg tablet, Take 1 tablet (75 mg) by mouth once daily., Disp: , Rfl:     DULoxetine (Cymbalta) 60 mg DR capsule, Take 1 capsule (60 mg) by mouth once daily in the evening., Disp: , Rfl:     empagliflozin-metformin (Synjardy XR) 12.5-1,000 mg 24 hr tablet, Take 2 tablets by mouth once daily with breakfast., Disp: , Rfl:     ipratropium (Atrovent) 42 mcg (0.06 %) nasal spray, Administer 1 spray into each nostril 2 times a day., Disp: , Rfl:     nitroglycerin (Nitrostat) 0.4 mg SL tablet, Place 1 tablet (0.4 mg) under the tongue every 5 minutes if needed for chest pain. May repeat dose every 5 minutes for up to 3 doses total., Disp: 100 tablet, Rfl: 3    omeprazole (PriLOSEC) 20 mg DR capsule, Take 1 capsule (20 mg) by mouth once daily in the morning. Take before meals., Disp: , Rfl:     rosuvastatin (Crestor) 20 mg tablet, Take 1 tablet (20 mg) by mouth once daily at bedtime., Disp: , Rfl:     semaglutide (Ozempic) 1 mg/dose (2 mg/1.5 mL) pen injector, Inject 1 mg under the skin 1 (one) time per week., Disp: , Rfl:                      Assessment/Plan   1. 2-vessel coronary artery disease  Follow Up In Cardiology      2. Precordial chest pain  Follow Up  In Cardiology      3. Benign essential hypertension        4. Cardiomyopathy, ischemic        5. Essential hypertension        6. Mixed hyperlipidemia        7. BMI 23.0-23.9, adult        8. Former smoker                 Scribe Attestation  By signing my name below, I, Amy Clements LPN   attest that this documentation has been prepared under the direction and in the presence of Ananda Marcial MD.     Provider Attestation - Scribe documentation    All medical record entries made by the Scribe were at my direction and personally dictated by me. I have reviewed the chart and agree that the record accurately reflects my personal performance of the history, physical exam, discussion and plan.

## 2025-02-24 ENCOUNTER — APPOINTMENT (OUTPATIENT)
Dept: CARDIOLOGY | Facility: CLINIC | Age: 65
End: 2025-02-24
Payer: OTHER GOVERNMENT

## 2025-03-24 ENCOUNTER — APPOINTMENT (OUTPATIENT)
Dept: CARDIOLOGY | Facility: CLINIC | Age: 65
End: 2025-03-24
Payer: OTHER GOVERNMENT

## 2025-03-24 VITALS
BODY MASS INDEX: 24.77 KG/M2 | DIASTOLIC BLOOD PRESSURE: 60 MMHG | HEART RATE: 76 BPM | HEIGHT: 70 IN | WEIGHT: 173 LBS | SYSTOLIC BLOOD PRESSURE: 112 MMHG

## 2025-03-24 DIAGNOSIS — I10 ESSENTIAL HYPERTENSION: ICD-10-CM

## 2025-03-24 DIAGNOSIS — I25.5 CARDIOMYOPATHY, ISCHEMIC: Primary | ICD-10-CM

## 2025-03-24 DIAGNOSIS — I25.10 2-VESSEL CORONARY ARTERY DISEASE: ICD-10-CM

## 2025-03-24 DIAGNOSIS — E78.2 MIXED HYPERLIPIDEMIA: ICD-10-CM

## 2025-03-24 PROBLEM — R07.2 PRECORDIAL CHEST PAIN: Status: RESOLVED | Noted: 2024-04-18 | Resolved: 2025-03-24

## 2025-03-24 PROCEDURE — 3078F DIAST BP <80 MM HG: CPT | Performed by: NURSE PRACTITIONER

## 2025-03-24 PROCEDURE — 1036F TOBACCO NON-USER: CPT | Performed by: NURSE PRACTITIONER

## 2025-03-24 PROCEDURE — 3008F BODY MASS INDEX DOCD: CPT | Performed by: NURSE PRACTITIONER

## 2025-03-24 PROCEDURE — 99214 OFFICE O/P EST MOD 30 MIN: CPT | Performed by: NURSE PRACTITIONER

## 2025-03-24 PROCEDURE — 3074F SYST BP LT 130 MM HG: CPT | Performed by: NURSE PRACTITIONER

## 2025-03-24 PROCEDURE — 1160F RVW MEDS BY RX/DR IN RCRD: CPT | Performed by: NURSE PRACTITIONER

## 2025-03-24 PROCEDURE — 1159F MED LIST DOCD IN RCRD: CPT | Performed by: NURSE PRACTITIONER

## 2025-03-24 ASSESSMENT — ENCOUNTER SYMPTOMS
PND: 0
IRREGULAR HEARTBEAT: 0
SYNCOPE: 0
DYSPNEA ON EXERTION: 0
PALPITATIONS: 0
ORTHOPNEA: 0
NEAR-SYNCOPE: 0

## 2025-03-24 NOTE — LETTER
"March 24, 2025     Matt Burton DO  [address] [facility] [address]    Patient: Jericho Emerson Jr.   YOB: 1960   Date of Visit: 3/24/2025       Dear Dr. Matt Burton DO:    Thank you for referring Jericho Emerson to me for evaluation. Below are my notes for this consultation.  If you have questions, please do not hesitate to call me. I look forward to following your patient along with you.       Sincerely,     Lisa Palmer, APRN-CNP      CC: No Recipients  ______________________________________________________________________________________    Chief Complaint  \"Doing fairly well\"    Reason for Visit  9-month follow-up  Patient presents to the office today for outpatient follow-up for hypertension, hyperlipidemia, and coronary artery disease.  Cardiomyopathy.  Last evaluated in clinic by Dr. Marcial July 2024.    Presents today ambulatory with steady gait.  Accompanied by patient  Patient denies any hospitalizations or significant changes to interval medical history since last office follow-up.   He follows routinely with PCP with annual wellness labs.    History of Present Illness   Patient is a very pleasant 64-year-old gentleman who presents to the office today with no voiced cardiovascular complaints.  He continues to work part-time as a  at [employer] where he is on his feet all day and lifting items.  His prior angina symptom was\" my left arm going numb\" and he denies any recurrence.  Ambulated in from the parking lot without dyspnea on exertion.  No evidence orthopnea or PND.  No palpitations.    Patient reports that overall has no complaint(s) of chest pain, chest pressure/discomfort, claudication, dyspnea, exertional chest pressure/discomfort, fatigue, irregular heart beat, and lower extremity edema    Daily activity:    Greater than 4 METS  Denies any change in exercise capacity or functional tolerance since last office visit.    The importance of " "secondary prevention reviewed:  HTN: Optimal  HLD: Treated, due for labs  DM: Recent hemoglobin A1c 7.0  Smoker: Denies  BMI:  Reviewed the merits of healthy lifestyle choices on overall cardiovascular health.    Discussed the dynamic nature of coronary artery disease and the importance of seeking medical attention if new symptoms arise.    Review of Systems   Cardiovascular:  Negative for chest pain, dyspnea on exertion, irregular heartbeat, leg swelling, near-syncope, orthopnea, palpitations, paroxysmal nocturnal dyspnea and syncope.        Visit Vitals  /60 (BP Location: Left arm, Patient Position: Sitting)   Pulse 76   Ht 1.778 m (5' 10\")   Wt 78.5 kg (173 lb)   BMI 24.82 kg/m²   Smoking Status Former   BSA 1.97 m²     Physical Exam  Vitals and nursing note reviewed.   Constitutional:       Appearance: Normal appearance.   Cardiovascular:      Rate and Rhythm: Normal rate and regular rhythm.      Heart sounds: Normal heart sounds.   Pulmonary:      Effort: Pulmonary effort is normal.      Breath sounds: Normal breath sounds.   Musculoskeletal:      Cervical back: Full passive range of motion without pain.      Right lower leg: No edema.      Left lower leg: No edema.   Skin:     General: Skin is cool.   Neurological:      Mental Status: He is alert and oriented to person, place, and time.   Psychiatric:         Attention and Perception: Attention normal.         Mood and Affect: Mood normal.         Behavior: Behavior is cooperative.        Allergies   Allergen Reactions   • Tetanus Vaccines And Toxoid Unknown     Other Reaction(s): unknown--childhood       Current Outpatient Medications   Medication Instructions   • amLODIPine (Norvasc) 5 mg tablet 1 tablet, Daily   • ARIPiprazole (ABILIFY) 15 mg, Daily   • buPROPion SR (WELLBUTRIN SR) 100 mg, oral, Daily   • cholecalciferol (VITAMIN D-3) 75 mcg, Daily   • clopidogrel (PLAVIX) 75 mg, Daily   • DULoxetine (Cymbalta) 60 mg DR capsule 1 capsule, Every " evening   • empagliflozin-metformin (Synjardy XR) 12.5-1,000 mg 24 hr tablet 2 tablets, Daily with breakfast   • ipratropium (Atrovent) 42 mcg (0.06 %) nasal spray 1 spray, 2 times daily   • nitroglycerin (NITROSTAT) 0.4 mg, sublingual, Every 5 min PRN, May repeat dose every 5 minutes for up to 3 doses total.   • omeprazole (PRILOSEC) 20 mg, Daily before breakfast   • Ozempic 1 mg, Once Weekly   • rosuvastatin (Crestor) 20 mg tablet 1 tablet, Nightly      Assessment:    2-vessel coronary artery disease  Jan 2015  mCX Cutting Balloon/PTCA/BUZZ 3 x 20 mm due to ISR  Distal RCA 99% ISR -PTCA less than 10% residual  Mid LAD 30%  LVEF 55%    April 2018 MPI  No ischemia, no infarct  LVEF 39%    June 2024 GXT  Nondiagnostic at 6.3 METS    Current daily activity greater than 4 METS without concerning symptoms    Cardiomyopathy, ischemic  January 2015 cardiac cath LVEF 55%  April 2018 MPI LVEF 39%  April 2018 TTE LVEF 40%    Remains functional class II    Currently no beta-blocker, ACE/ARB, SGLT2i    Repeat echocardiogram to reassess and return to clinic to optimize treatment if warranted.    Essential hypertension  Optimal in office    Mixed hyperlipidemia  High intensity statin  Annual wellness labs through PCP    Plan:     Through informed decision making process incorporating patients unique circumstances, the following treatment plan will be initiated:    1.  Prescription drug management of cardiovascular medication for efficacy, adherence to treatment, side effect assessment and polypharmacy. Current treatment clinically warranted and to continue without modifications.    2.  Echo (ICM)    3. Return for follow-up; in the interim, contact the office if new symptoms arise.  Dr. Marcial 9 months unless abnormal testing    Lisa Palmer MSN, APRN-CNP, PMHNP-[facility] [place]  [place], [place]     Please excuse any errors in grammar or translation related to this dictation.  Voice recognition software was utilized to prepare this document.

## 2025-03-24 NOTE — PATIENT INSTRUCTIONS
Please bring all medicines, vitamins, and herbal supplements with you when you come to the office.    Prescriptions will not be filled unless you are compliant with your follow up appointments or have a follow up appointment scheduled as per instruction of your physician. Refills should be requested at the time of your visit.     PLAN:   Through informed decision making process incorporating patients unique circumstances, the following treatment plan will be initiated:    1.  Prescription drug management of cardiovascular medication for efficacy, adherence to treatment, side effect assessment and polypharmacy. Current treatment clinically warranted and to continue without modifications.    2.  Echo (ICM)    3. Return for follow-up; in the interim, contact the office if new symptoms arise.  Dr. Marcial 9 months unless abnormal testing

## 2025-03-24 NOTE — ASSESSMENT & PLAN NOTE
Jan 2015  mCX Cutting Balloon/PTCA/BUZZ 3 x 20 mm due to ISR  Distal RCA 99% ISR -PTCA less than 10% residual  Mid LAD 30%  LVEF 55%    April 2018 MPI  No ischemia, no infarct  LVEF 39%    June 2024 GXT  Nondiagnostic at 6.3 METS    Current daily activity greater than 4 METS without concerning symptoms

## 2025-03-24 NOTE — ASSESSMENT & PLAN NOTE
January 2015 cardiac cath LVEF 55%  April 2018 MPI LVEF 39%  April 2018 TTE LVEF 40%    Remains functional class II    Currently no beta-blocker, ACE/ARB, SGLT2i    Repeat echocardiogram to reassess and return to clinic to optimize treatment if warranted.

## 2025-03-24 NOTE — PROGRESS NOTES
"Chief Complaint  \"Doing fairly well\"    Reason for Visit  9-month follow-up  Patient presents to the office today for outpatient follow-up for hypertension, hyperlipidemia, and coronary artery disease.  Cardiomyopathy.  Last evaluated in clinic by Dr. Marcial July 2024.    Presents today ambulatory with steady gait.  Accompanied by patient  Patient denies any hospitalizations or significant changes to interval medical history since last office follow-up.   He follows routinely with PCP with annual wellness labs.    History of Present Illness   Patient is a very pleasant 64-year-old gentleman who presents to the office today with no voiced cardiovascular complaints.  He continues to work part-time as a  at [employer] where he is on his feet all day and lifting items.  His prior angina symptom was\" my left arm going numb\" and he denies any recurrence.  Ambulated in from the parking lot without dyspnea on exertion.  No evidence orthopnea or PND.  No palpitations.    Patient reports that overall has no complaint(s) of chest pain, chest pressure/discomfort, claudication, dyspnea, exertional chest pressure/discomfort, fatigue, irregular heart beat, and lower extremity edema    Daily activity:    Greater than 4 METS  Denies any change in exercise capacity or functional tolerance since last office visit.    The importance of secondary prevention reviewed:  HTN: Optimal  HLD: Treated, due for labs  DM: Recent hemoglobin A1c 7.0  Smoker: Denies  BMI:  Reviewed the merits of healthy lifestyle choices on overall cardiovascular health.    Discussed the dynamic nature of coronary artery disease and the importance of seeking medical attention if new symptoms arise.    Review of Systems   Cardiovascular:  Negative for chest pain, dyspnea on exertion, irregular heartbeat, leg swelling, near-syncope, orthopnea, palpitations, paroxysmal nocturnal dyspnea and syncope.        Visit Vitals  /60 (BP Location: Left arm, " "Patient Position: Sitting)   Pulse 76   Ht 1.778 m (5' 10\")   Wt 78.5 kg (173 lb)   BMI 24.82 kg/m²   Smoking Status Former   BSA 1.97 m²     Physical Exam  Vitals and nursing note reviewed.   Constitutional:       Appearance: Normal appearance.   Cardiovascular:      Rate and Rhythm: Normal rate and regular rhythm.      Heart sounds: Normal heart sounds.   Pulmonary:      Effort: Pulmonary effort is normal.      Breath sounds: Normal breath sounds.   Musculoskeletal:      Cervical back: Full passive range of motion without pain.      Right lower leg: No edema.      Left lower leg: No edema.   Skin:     General: Skin is cool.   Neurological:      Mental Status: He is alert and oriented to person, place, and time.   Psychiatric:         Attention and Perception: Attention normal.         Mood and Affect: Mood normal.         Behavior: Behavior is cooperative.        Allergies   Allergen Reactions    Tetanus Vaccines And Toxoid Unknown     Other Reaction(s): unknown--childhood       Current Outpatient Medications   Medication Instructions    amLODIPine (Norvasc) 5 mg tablet 1 tablet, Daily    ARIPiprazole (ABILIFY) 15 mg, Daily    buPROPion SR (WELLBUTRIN SR) 100 mg, oral, Daily    cholecalciferol (VITAMIN D-3) 75 mcg, Daily    clopidogrel (PLAVIX) 75 mg, Daily    DULoxetine (Cymbalta) 60 mg DR capsule 1 capsule, Every evening    empagliflozin-metformin (Synjardy XR) 12.5-1,000 mg 24 hr tablet 2 tablets, Daily with breakfast    ipratropium (Atrovent) 42 mcg (0.06 %) nasal spray 1 spray, 2 times daily    nitroglycerin (NITROSTAT) 0.4 mg, sublingual, Every 5 min PRN, May repeat dose every 5 minutes for up to 3 doses total.    omeprazole (PRILOSEC) 20 mg, Daily before breakfast    Ozempic 1 mg, Once Weekly    rosuvastatin (Crestor) 20 mg tablet 1 tablet, Nightly      Assessment:    2-vessel coronary artery disease  Jan 2015  mCX Cutting Balloon/PTCA/BUZZ 3 x 20 mm due to ISR  Distal RCA 99% ISR -PTCA less than 10% " residual  Mid LAD 30%  LVEF 55%    April 2018 MPI  No ischemia, no infarct  LVEF 39%    June 2024 GXT  Nondiagnostic at 6.3 METS    Current daily activity greater than 4 METS without concerning symptoms    Cardiomyopathy, ischemic  January 2015 cardiac cath LVEF 55%  April 2018 MPI LVEF 39%  April 2018 TTE LVEF 40%    Remains functional class II    Currently no beta-blocker, ACE/ARB, SGLT2i    Repeat echocardiogram to reassess and return to clinic to optimize treatment if warranted.    Essential hypertension  Optimal in office    Mixed hyperlipidemia  High intensity statin  Annual wellness labs through PCP    Plan:     Through informed decision making process incorporating patients unique circumstances, the following treatment plan will be initiated:    1.  Prescription drug management of cardiovascular medication for efficacy, adherence to treatment, side effect assessment and polypharmacy. Current treatment clinically warranted and to continue without modifications.    2.  Echo (ICM)    3. Return for follow-up; in the interim, contact the office if new symptoms arise.  Dr. Marcial 9 months unless abnormal testing    Lisa Palmer MSN, APRN-CNP, PMHNP-[facility] [place]  [place], [place]     Please excuse any errors in grammar or translation related to this dictation. Voice recognition software was utilized to prepare this document.

## 2025-05-27 ENCOUNTER — HOSPITAL ENCOUNTER (OUTPATIENT)
Dept: CARDIOLOGY | Facility: CLINIC | Age: 65
Discharge: HOME | End: 2025-05-27
Payer: OTHER GOVERNMENT

## 2025-05-27 VITALS
DIASTOLIC BLOOD PRESSURE: 70 MMHG | HEIGHT: 70 IN | BODY MASS INDEX: 24.77 KG/M2 | WEIGHT: 173 LBS | SYSTOLIC BLOOD PRESSURE: 114 MMHG

## 2025-05-27 DIAGNOSIS — I25.5 CARDIOMYOPATHY, ISCHEMIC: ICD-10-CM

## 2025-05-27 DIAGNOSIS — I25.10 2-VESSEL CORONARY ARTERY DISEASE: ICD-10-CM

## 2025-05-27 PROCEDURE — 93306 TTE W/DOPPLER COMPLETE: CPT

## 2025-05-28 LAB
AORTIC VALVE MEAN GRADIENT: 4 MMHG
AORTIC VALVE PEAK VELOCITY: 1.35 M/S
AV PEAK GRADIENT: 7 MMHG
AVA (PEAK VEL): 3.3 CM2
AVA (VTI): 2.89 CM2
EJECTION FRACTION APICAL 4 CHAMBER: 46.2
EJECTION FRACTION: 48 %
LEFT ATRIUM VOLUME AREA LENGTH INDEX BSA: 23.6 ML/M2
LEFT VENTRICLE INTERNAL DIMENSION DIASTOLE: 4.69 CM (ref 3.5–6)
LEFT VENTRICULAR OUTFLOW TRACT DIAMETER: 2.45 CM
LV EJECTION FRACTION BIPLANE: 44 %
MITRAL VALVE E/A RATIO: 0.68
MITRAL VALVE E/E' RATIO: 6.92
RIGHT VENTRICLE FREE WALL PEAK S': 17.71 CM/S
TRICUSPID ANNULAR PLANE SYSTOLIC EXCURSION: 2.7 CM

## 2025-05-29 ENCOUNTER — TELEPHONE (OUTPATIENT)
Dept: CARDIOLOGY | Facility: CLINIC | Age: 65
End: 2025-05-29
Payer: OTHER GOVERNMENT

## 2025-05-29 NOTE — TELEPHONE ENCOUNTER
----- Message from Lisa Palmer sent at 5/29/2025  9:01 AM EDT -----  Please let him know heart muscle function has improved to 45-50%.  He is already on Jardiance for diabetic treatment and that is beneficial for mild cardiomyopathy.  Benefits of Jardiance for mild cardiomyopathy:   Reduces the risk of cardiovascular death and hospitalization for heart failure  Improves heart function and reduces symptoms  Preserves kidney function     Otherwise, would not recommend any additional changes at this point.  ----- Message -----  From: Anna Marie Garciao - Cardiology Results In  Sent: 5/28/2025   6:27 PM EDT  To: SIMONE Rucker-CNP

## 2025-05-29 NOTE — TELEPHONE ENCOUNTER
Result Communication    Resulted Orders   Transthoracic Echo Complete   Result Value Ref Range    AV mn grad 4 mmHg    AV pk rolanda 1.35 m/s    LV Biplane EF 44 %    LVOT diam 2.45 cm    MV E/A ratio 0.68     Tricuspid annular plane systolic excursion 2.7 cm    MV avg E/e' ratio 6.92     LA vol index A/L 23.6 ml/m2    LV EF 48 %    RV free wall pk S' 17.71 cm/s    LVIDd 4.69 cm    Aortic Valve Area by Continuity of Peak Velocity 3.30 cm2    AV pk grad 7 mmHg    Aortic Valve Area by Continuity of VTI 2.89 cm2    LV A4C EF 46.2     Narrative                   [address], [address], [place] [address]          Tel [phone] Fax [phone]    TRANSTHORACIC ECHOCARDIOGRAM REPORT    Patient Name:       JUAN PABLO Albrecht Physician:    [id number] Ananda Marcial MD  Study Date:         5/27/2025           Ordering Provider:    [id number] WINIFRED LARES  MRN/PID:            [id number]            Fellow:  Accession#:         [id number]        Nurse:  Date of Birth/Age:  1960 / 64 years Sonographer:          Susy Small RDCS, RVT  Gender Assigned at  M                   Additional Staff:  Birth:  Height:             177.80 cm           Admit Date:  Weight:             78.47 kg            Admission Status:     Outpatient  BSA / BMI:          1.96 m2 / 24.82     Department Location:  [facility]                      kg/m2                                     [place]  Blood Pressure: 114 /70 mmHg    Study Type:    TRANSTHORACIC ECHO (TTE) COMPLETE  Diagnosis/ICD: Atherosclerotic heart disease of native coronary artery without                 angina pectoris-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       PHYSICIAN INTERPRETATION:  Left Ventricle: The left ventricular systolic function is mildly decreased with a visually estimated ejection fraction of 45-50%. There are no regional wall motion abnormalities. The left ventricular cavity size is normal. There is normal posterior left ventricular wall thickness. Spectral Doppler shows a Grade I (impaired relaxation pattern) of left ventricular diastolic filling.  Left Atrium: The left atrial size is normal.  Right Ventricle: The right ventricle is normal in size. There is normal right ventricular global systolic function.  Right Atrium: The right atrial size is normal.  Aortic Valve: The aortic valve is trileaflet. The aortic valve area by VTI is 2.89 cmï¿½ with a peak velocity of 1.35 m/s. The peak and mean gradients are 7 mmHg and 4 mmHg, respectively, with a dimensionless index of 0.61. The aortic valve dimensionless index is 0.61. There is no evidence of aortic valve regurgitation. The peak instantaneous gradient of the aortic valve is 7 mmHg. The mean gradient of the aortic valve is 4 mmHg.  Mitral Valve: The mitral valve is normal in structure. The doppler estimated peak and mean diastolic gradients are 5 mmHg and 2 mmHg, respectively. The peak instantaneous gradient of the mitral valve is 5 mmHg. There is mild mitral valve regurgitation. The E Vmax is 0.59 m/s.  Tricuspid Valve: The tricuspid valve is structurally normal. No evidence of tricuspid regurgitation.  Pulmonic Valve: The pulmonic valve is structurally normal. There is no indication of pulmonic valve regurgitation.  Pericardium: No pericardial effusion noted.  Aorta: The aortic root is normal.  In comparison to the previous echocardiogram(s): When compared to previous study LVEF has improved.       CONCLUSIONS:   1. The left ventricular systolic function is mildly decreased with a visually  estimated ejection fraction of 45-50%.   2. Spectral Doppler shows a Grade I (impaired relaxation pattern) of left ventricular diastolic filling.   3. There is normal right ventricular global systolic function.   4. Mild mitral valve regurgitation.   5. When compared to previous study LVEF has improved.    QUANTITATIVE DATA SUMMARY:     2D MEASUREMENTS:             Normal Ranges:  Ao Root s:       3.00 cm  LAs:             3.24 cm     (2.7-4.0cm)  RVIDd:           2.44 cm     (0.9-3.6cm)  IVSd:            1.04 cm     (0.6-1.1cm)  LVPWd:           0.94 cm     (0.6-1.1cm)  LVIDd:           4.69 cm     (3.9-5.9cm)  LVIDs:           3.20 cm  LV Mass Index:   82.1 g/m2  LVEDV Index:     43.72 ml/m2  LV % FS          31.8 %       LEFT ATRIUM:                 Normal Ranges:  LA Vol A4C:       39.9 ml    (22+/-6mL/m2)  LA Vol A2C:       52.9 ml  LA Vol BP:        46.3 ml  LA Vol Index A4C: 20.3ml/m2  LA Vol Index A2C: 27.0 ml/m2  LA Vol Index BP:  23.6 ml/m2  LA Vol A4C:       36.5 ml  LA Vol A2C:       48.9 ml  LA Vol Index BSA: 21.7 ml/m2       LV SYSTOLIC FUNCTION:                       Normal Ranges:  EF-A4C View:    46 % (>=55%)  EF-A2C View:    42 %  EF-Biplane:     44 %  EF-Visual:      48 %  LV EF Reported: 48 %       LV DIASTOLIC FUNCTION:           Normal Ranges:  MV Peak E:             0.59 m/s  (0.7-1.2 m/s)  MV Peak A:             0.86 m/s  (0.42-0.7 m/s)  E/A Ratio:             0.68      (1.0-2.2)  MV e'                  0.085 m/s (>8.0)  MV lateral e'          0.09 m/s  MV medial e'           0.08 m/s  E/e' Ratio:            6.92      (<8.0)       MITRAL VALVE:          Normal Ranges:  MV Vmax:      1.08 m/s (<=1.3m/s)  MV peak P.7 mmHg (<5mmHg)  MV mean P.7 mmHg (<48mmHg)  MV VTI:       21.23 cm (10-13cm)  MV DT:        249 msec (150-240msec)       MITRAL INSUFFICIENCY:             Normal Ranges:  MR Vmax:              244.08 cm/s       AORTIC VALVE:                     Normal Ranges:  AoV Vmax:                 1.35 m/s (<=1.7m/s)  AoV Peak P.2 mmHg (<20mmHg)  AoV Mean P.4 mmHg (1.7-11.5mmHg)  LVOT Max Joao:            0.94 m/s (<=1.1m/s)  AoV VTI:                 25.80 cm (18-25cm)  LVOT VTI:                15.86 cm  LVOT Diameter:           2.45 cm  (1.8-2.4cm)  AoV Area, VTI:           2.89 cm2 (2.5-5.5cm2)  AoV Area,Vmax:           3.30 cm2 (2.5-4.5cm2)  AoV Dimensionless Index: 0.61       RIGHT VENTRICLE:  RV Basal 3.30 cm  RV Mid   2.80 cm  RV Major 4.9 cm  TAPSE:   27.4 mm  RV s'    0.18 m/s       TRICUSPID VALVE/RVSP:         Normal Ranges:  IVC Diam:             1.74 cm       PULMONIC VALVE:          Normal Ranges:  RVOT Vmax:      0.98 m/s (0.6-0.9m/s)       AORTA:  Asc Ao Diam 2.98 cm       [id number] Ananda Marcial MD  Electronically signed on 2025 at 6:27:53 PM         ** Final **         9:18 AM      Results were successfully communicated with the patient and they acknowledged their understanding.

## 2025-12-02 ENCOUNTER — APPOINTMENT (OUTPATIENT)
Dept: CARDIOLOGY | Facility: CLINIC | Age: 65
End: 2025-12-02
Payer: OTHER GOVERNMENT